# Patient Record
Sex: FEMALE | Race: WHITE | NOT HISPANIC OR LATINO | Employment: PART TIME | URBAN - METROPOLITAN AREA
[De-identification: names, ages, dates, MRNs, and addresses within clinical notes are randomized per-mention and may not be internally consistent; named-entity substitution may affect disease eponyms.]

---

## 2017-08-16 ENCOUNTER — ALLSCRIPTS OFFICE VISIT (OUTPATIENT)
Dept: OTHER | Facility: OTHER | Age: 47
End: 2017-08-16

## 2017-08-28 ENCOUNTER — GENERIC CONVERSION - ENCOUNTER (OUTPATIENT)
Dept: OTHER | Facility: OTHER | Age: 47
End: 2017-08-28

## 2017-10-31 ENCOUNTER — ALLSCRIPTS OFFICE VISIT (OUTPATIENT)
Dept: OTHER | Facility: OTHER | Age: 47
End: 2017-10-31

## 2017-11-01 NOTE — PROGRESS NOTES
Assessment  1  Acute sinusitis (461 9) (J01 90)   2  Allergic rhinitis (477 9) (J30 9)   3  Mild persistent asthma (493 90) (J45 30)    Plan  Acute sinusitis    · Amoxicillin 875 MG Oral Tablet; TAKE 1 TABLET EVERY 12 HOURS DAILY with  food  Acute sinusitis, Allergic rhinitis    · MethylPREDNISolone 4 MG Oral Tablet Therapy Pack; take as directed  Mild persistent asthma    · Symbicort 160-4 5 MCG/ACT Inhalation Aerosol; INHALE 2 PUFFS TWICE DAILY  RINSE MOUTH AFTER USE   · From  Ventolin  (90 Base) MCG/ACT Inhalation Aerosol Solution USE  2 PUFFS EVERY 4-6 HOURS AS NEEDED To Ventolin  (90 Base) MCG/ACT  Inhalation Aerosol Solution use 2 puffs every 4-6 hours as needed    Discussion/Summary    1  Acute sinusitis - will start cAmoxil 875 mg one tablet twice daily for 10 days, Medrol Dosepak  rhinitis ârecommended to use saline sinus rinse  persistent asthma - start Symbicort 160/4 5 mcg 2 puffs twice daily  Use Ventolin HFA inhaler PRN  was advised to call with update on her symptoms in 7-10 days  The patient was counseled regarding instructions for management,-- risk factor reductions,-- risks and benefits of treatment options,-- importance of compliance with treatment  total time of encounter was 25 minutes-- and-- 15 minutes was spent counseling  Possible side effects of new medications were reviewed with the patient/guardian today  The treatment plan was reviewed with the patient/guardian  The patient/guardian understands and agrees with the treatment plan      Chief Complaint  Patient presents with a sinus headache and ringing in her ears  History of Present Illness  HPI: Patient presents to the office c/o sinus congestion/ pressure, sinus headache, throbbing sensation in her face for the past week  has history of allergic rhinitis and asthma  Denies fever, chills  No sore throat    has been using Ventolin HFA inhaler a few times per day recently  states that her symptoms worsening with exposure to dust and pollens  ringing in ears for the last few months  was evaluated by ENT Dr Caden Mayen in August 2017, had hearing test done which showed mild sensorineural hearing hearing loss  - patient tried Claritin, Allegra, Zyrtec, Singular without any relief in symptoms  did not try steroid nasal spray because she does not like fluid going into her nose '       Review of Systems    Constitutional: feeling tired, but-- no fever-- and-- no chills  ENT: as noted in HPI  Cardiovascular: no chest pain-- and-- no palpitations  Respiratory: intermittent chest tightness, but-- no shortness of breath-- and-- no wheezing--    The patient presents with complaints of occasional episodes of mild cough, described as dry  Gastrointestinal: no abdominal pain,-- no nausea,-- no vomiting-- and-- no diarrhea  Integumentary: no rashes,-- no itching-- and-- no skin wound  Neurological: headache--    The patient presents with complaints of occasional episodes of mild dizziness  Active Problems  1  Acute sinusitis (461 9) (J01 90)   2  Other seasonal allergic rhinitis (477 8) (J30 2)   3  Tinnitus of both ears (388 30) (H93 13)    Past Medical History  1  History of Moderate persistent asthma with acute exacerbation (493 92) (J45 41)  Active Problems And Past Medical History Reviewed: The active problems and past medical history were reviewed and updated today  Family History  Mother    1  Family history of diabetes mellitus (V18 0) (Z83 3)   2  Family history of hypertension (V17 49) (Z82 49)   3  Family history of thyroid disease (V18 19) (Z83 49)  Father    4  Family history of malignant neoplasm of prostate (V16 42) (Z80 45)  Family History Reviewed: The family history was reviewed and updated today  Social History   · No drug use   · Quit smoking (V15 82) (Z87 891)   · Social drinker  The social history was reviewed and updated today  Surgical History  1  History of Foot Surgery   2  History of Tonsillectomy  Surgical History Reviewed: The surgical history was reviewed and updated today  Current Meds   1  Montelukast Sodium 10 MG Oral Tablet; TAKE 1 TABLET AT BEDTIME; Therapy: [id number] to (Last Rx:19Jan2016) Ordered   2  Ventolin  (90 Base) MCG/ACT Inhalation Aerosol Solution; USE 2 PUFFS   EVERY 4-6 HOURS AS NEEDED; Therapy: [id number] to (Complete:66Ebd9305)  Requested for: 44WOT2045; Last   Rx:[id number] Ordered    The medication list was reviewed and updated today  Allergies  1  No Known Drug Allergies    Vitals   Recorded: 56OXA0640 02:24PM   Temperature 98 7 F, Tympanic   Heart Rate 94, L Radial   Pulse Quality Normal, L Radial   Respiration Quality Normal   Respiration 16   Systolic 568, LUE, Sitting   Diastolic 78, LUE, Sitting   Height 5 ft 4 5 in   Weight 171 lb 6 oz   BMI Calculated 28 96   BSA Calculated 1 84   Pain Scale 5     Physical Exam    Constitutional   General appearance: No acute distress, well appearing and well nourished  Eyes   Conjunctiva and lids: No swelling, erythema or discharge  Pupils and irises: Equal, round and reactive to light  Ears, Nose, Mouth, and Throat   External inspection of ears and nose: Normal     Otoscopic examination: Tympanic membranes translucent with normal light reflex  Canals patent without erythema  Nasal mucosa, septum, and turbinates: Abnormal   The bilateral nasal mucosa was edematous-- and-- red  Oropharynx: Normal with no erythema, edema, exudate or lesions  Pulmonary   Respiratory effort: No increased work of breathing or signs of respiratory distress  Auscultation of lungs: Clear to auscultation  Cardiovascular   Auscultation of heart: Normal rate and rhythm, normal S1 and S2, without murmurs  Examination of extremities for edema and/or varicosities: Normal     Abdomen   Abdomen: Non-tender, no masses  Lymphatic   Palpation of lymph nodes in neck: No lymphadenopathy      Skin   Skin and subcutaneous tissue: Normal without rashes or lesions      Psychiatric   Mood and affect: Normal          Signatures   Electronically signed by : SHANTELLE Melendez ; Oct 31 2017  3:04PM EST                       (Author)

## 2018-01-11 NOTE — PROGRESS NOTES
Assessment    1  Moderate persistent asthma with acute exacerbation (493 92) (J45 41)   2  Other seasonal allergic rhinitis (477 8) (J30 2)    Plan  Moderate persistent asthma with acute exacerbation    · Albuterol Sulfate (2 5 MG/3ML) 0 083% Inhalation Nebulization Solution; INHALE 1  MINI NEB VIA NEBULIZER ORALLY EVERY 6 HOURS AS NEEDED FOR SHORTNESS  OF BREATH *RE-ORDER*   · Dulera 200-5 MCG/ACT Inhalation Aerosol; USE 2 INHALATIONS EVERY 12  HOURS   · Respiratory Equipment Nebulizer; Status:Complete;   Done: [id number]  Moderate persistent asthma with acute exacerbation, Other seasonal allergic rhinitis    · Montelukast Sodium 10 MG Oral Tablet (Singulair); TAKE 1 TABLET AT BEDTIME   · Follow-up visit in 2 weeks Evaluation and Treatment  Follow-up  Status: Hold For -  Scheduling  Requested for: [id number]  Other seasonal allergic rhinitis    · Nasonex 50 MCG/ACT Nasal Suspension; use 2 spr  in each nostril daily    Discussion/Summary    1  Moderate persistent asthma with acute exacerbation - discussed treatment options  Recommended to start Albuterol 2 5 mg via nebulizer at home every 6 hours  Prescription given for nebulizer machine for home use  Start Dulera 200/5 mcg 2 puffs twice daily  Sample provided to patient  2 Allergic rhinitis -start Nasonex 2 sprays in each nostril  Sample given to patient  Start Singular 10 mg at bedtime  Call if symptoms persist or worsen  Schedule followup office visit in 2 weeks  Possible side effects of new medications were reviewed with the patient/guardian today  The treatment plan was reviewed with the patient/guardian  The patient/guardian understands and agrees with the treatment plan   The patient was counseled regarding instructions for management, risk factor reductions, risks and benefits of treatment options, importance of compliance with treatment        Chief Complaint  Patient is here to follow up her visit to [facility] for asthma where she was diagnosed with acute bronchitis  Patient states she is not better, she still sneezing constantly and have stuffy nose  History of Present Illness  Patient is 76-year-old female with h/o Asthma, Allergic rhinitis  She presents to the office for ER followup visit  Patient was seen on December 25, 2015 in [facility] ER for acute bronchitis, asthma exacerbation  She was given prescription for Prednisone, Zithromax, Albuterol  Patient states that she does not feel better  C/o dry cough, sneezing, chest tightness, wheezing  No fever or chills  Patient states that [facility] not helping  Patient quit smoking in 11/15  Patient does not have medical insurance  She states that she can not afford to pay for mammogram, gynecologic exam, Pap smear  Review of Systems    Constitutional: no fever, no chills and not feeling tired  Eyes: no eye pain, no eyesight problems, no dryness of the eyes, eyes not red, no purulent discharge from the eyes and no itching of the eyes  ENT: sneezing, PND, but no earache, no nosebleeds, no sore throat, no hearing loss, no nasal discharge and no hoarseness  Cardiovascular: no chest pain, no intermittent leg claudication, no palpitations and no lower extremity edema  Respiratory: wheezing and chest tightness, but no shortness of breath    The patient presents with complaints of cough, described as non-productive  Gastrointestinal: no abdominal pain, no nausea, no vomiting, no constipation, no diarrhea and no blood in stools  Musculoskeletal: no arthralgias, no joint swelling and no myalgias  Integumentary: no rashes, no itching and no skin wound  Neurological: no headache and no dizziness  Hematologic/Lymphatic: No complaints of swollen glands, no swollen glands in the neck, does not bleed easily, does not bruise easily  Active Problems    1  Asthma (547 90) (J45 909)   2   Other seasonal allergic rhinitis (477 8) (J30 2)    Surgical History    1  History of Foot Surgery   2  History of Tonsillectomy    Family History    1  Family history of diabetes mellitus (V18 0) (Z83 3)   2  Family history of hypertension (V17 49) (Z82 49)   3  Family history of thyroid disease (V18 19) (Z83 49)    4  Family history of malignant neoplasm of prostate (E61 38) (Z80 42)    Social History    · No drug use   · Quit smoking (V15 82) (Z87 898)   · Social drinker  The social history was reviewed and updated today  Current Meds   1  Ventolin  (90 Base) MCG/ACT Inhalation Aerosol Solution; USE 2 PUFFS EVERY   4-6 HOURS AS NEEDED; Therapy: 20ZDZ3330 to (Complete:26Mar2016); Last Rx:27Nov2015 Ordered    The medication list was reviewed and updated today  Allergies    1  No Known Drug Allergies    Vitals  Vital Signs [Data Includes: Current Encounter]    Recorded: 93XOA5569 08:52AM   Temperature 99 F   Heart Rate 85   Respiration 16   Systolic 445   Diastolic 80   Height 5 ft 4 5 in   Weight 168 lb 4 00 oz   BMI Calculated 28 43   BSA Calculated 1 83   O2 Saturation 99   Pain Scale 0     Physical Exam    Constitutional   General appearance: No acute distress, well appearing and well nourished  Eyes   Conjunctiva and lids: No swelling, erythema or discharge  Pupils and irises: Equal, round and reactive to light  Ears, Nose, Mouth, and Throat   External inspection of ears and nose: Normal     Otoscopic examination: Tympanic membranes translucent with normal light reflex  Canals patent without erythema  Nasal mucosa, septum, and turbinates: Abnormal   The bilateral nasal mucosa was edematous and red  Oropharynx: Normal with no erythema, edema, exudate or lesions  Pulmonary   Respiratory effort: No increased work of breathing or signs of respiratory distress  Auscultation of lungs: Abnormal   Auscultation of the lungs revealed decreased air entry, scattered wheezing     Cardiovascular   Auscultation of heart: Normal rate and rhythm, normal S1 and S2, without murmurs  Examination of extremities for edema and/or varicosities: Normal     Abdomen   Abdomen: Non-tender, no masses  Liver and spleen: No hepatomegaly or splenomegaly  Musculoskeletal   Gait and station: Normal     Digits and nails: Normal without clubbing or cyanosis  Inspection/palpation of joints, bones, and muscles: Normal     Skin   Skin and subcutaneous tissue: Normal without rashes or lesions           Signatures   Electronically signed by : SHANTELLE Chambers ; Jan 19 2016 10:14AM EST                       (Author)

## 2018-01-12 VITALS
TEMPERATURE: 98.2 F | SYSTOLIC BLOOD PRESSURE: 120 MMHG | RESPIRATION RATE: 12 BRPM | WEIGHT: 170.13 LBS | HEIGHT: 65 IN | HEART RATE: 62 BPM | BODY MASS INDEX: 28.35 KG/M2 | DIASTOLIC BLOOD PRESSURE: 74 MMHG

## 2018-01-13 VITALS
RESPIRATION RATE: 16 BRPM | HEART RATE: 94 BPM | HEIGHT: 65 IN | SYSTOLIC BLOOD PRESSURE: 120 MMHG | TEMPERATURE: 98.7 F | BODY MASS INDEX: 28.55 KG/M2 | WEIGHT: 171.38 LBS | DIASTOLIC BLOOD PRESSURE: 78 MMHG

## 2018-01-24 ENCOUNTER — OFFICE VISIT (OUTPATIENT)
Dept: FAMILY MEDICINE CLINIC | Facility: CLINIC | Age: 48
End: 2018-01-24
Payer: COMMERCIAL

## 2018-01-24 ENCOUNTER — TRANSCRIBE ORDERS (OUTPATIENT)
Dept: ADMINISTRATIVE | Age: 48
End: 2018-01-24

## 2018-01-24 ENCOUNTER — APPOINTMENT (OUTPATIENT)
Dept: LAB | Facility: HOSPITAL | Age: 48
End: 2018-01-24
Payer: COMMERCIAL

## 2018-01-24 ENCOUNTER — DOCUMENTATION (OUTPATIENT)
Dept: FAMILY MEDICINE CLINIC | Facility: CLINIC | Age: 48
End: 2018-01-24

## 2018-01-24 ENCOUNTER — APPOINTMENT (OUTPATIENT)
Dept: RADIOLOGY | Age: 48
End: 2018-01-24
Payer: COMMERCIAL

## 2018-01-24 VITALS
WEIGHT: 172.8 LBS | HEART RATE: 114 BPM | HEIGHT: 66 IN | RESPIRATION RATE: 20 BRPM | BODY MASS INDEX: 27.77 KG/M2 | OXYGEN SATURATION: 92 % | TEMPERATURE: 101 F | SYSTOLIC BLOOD PRESSURE: 108 MMHG | DIASTOLIC BLOOD PRESSURE: 62 MMHG

## 2018-01-24 DIAGNOSIS — J45.21 MILD INTERMITTENT ASTHMA WITH ACUTE EXACERBATION: ICD-10-CM

## 2018-01-24 DIAGNOSIS — J11.1 INFLUENZA: ICD-10-CM

## 2018-01-24 DIAGNOSIS — J11.1 INFLUENZA: Primary | ICD-10-CM

## 2018-01-24 PROBLEM — J45.901 ACUTE ASTHMA EXACERBATION: Status: ACTIVE | Noted: 2018-01-24

## 2018-01-24 PROCEDURE — 71046 X-RAY EXAM CHEST 2 VIEWS: CPT

## 2018-01-24 PROCEDURE — 87798 DETECT AGENT NOS DNA AMP: CPT

## 2018-01-24 PROCEDURE — 99214 OFFICE O/P EST MOD 30 MIN: CPT | Performed by: FAMILY MEDICINE

## 2018-01-24 RX ORDER — BUDESONIDE AND FORMOTEROL FUMARATE DIHYDRATE 160; 4.5 UG/1; UG/1
2 AEROSOL RESPIRATORY (INHALATION) 2 TIMES DAILY
COMMUNITY
Start: 2017-10-31 | End: 2018-06-18 | Stop reason: SDUPTHER

## 2018-01-24 RX ORDER — ALBUTEROL SULFATE 90 UG/1
2 AEROSOL, METERED RESPIRATORY (INHALATION) DAILY
COMMUNITY
Start: 2015-11-27 | End: 2018-02-28

## 2018-01-24 RX ORDER — DEXTROMETHORPHAN HYDROBROMIDE AND PROMETHAZINE HYDROCHLORIDE 15; 6.25 MG/5ML; MG/5ML
5 SYRUP ORAL 4 TIMES DAILY PRN
Qty: 240 ML | Refills: 0 | Status: SHIPPED | OUTPATIENT
Start: 2018-01-24 | End: 2018-08-09 | Stop reason: ALTCHOICE

## 2018-01-24 RX ORDER — PREDNISONE 10 MG/1
20 TABLET ORAL DAILY
Qty: 20 TABLET | Refills: 0 | Status: SHIPPED | OUTPATIENT
Start: 2018-01-24 | End: 2018-01-29

## 2018-01-24 RX ORDER — OSELTAMIVIR PHOSPHATE 75 MG/1
75 CAPSULE ORAL EVERY 12 HOURS SCHEDULED
Qty: 10 CAPSULE | Refills: 0 | Status: SHIPPED | OUTPATIENT
Start: 2018-01-24 | End: 2018-01-29

## 2018-01-24 NOTE — LETTER
January 24, 2018     Patient: Dasha Gomez   YOB: 1970   Date of Visit: 1/24/2018       To Whom it May Concern:    Usama Jones is under my professional care  She was seen in my office on 1/24/2018  She may return to work on 01/29/2018  If you have any questions or concerns, please don't hesitate to call           Sincerely,          Maggie Clement MD        CC: No Recipients

## 2018-01-24 NOTE — PROGRESS NOTES
Chief Complaint   Patient presents with    Generalized Body Aches     Mostly all over body    Cough     productiev- beige mucous    Fever     sweats & chills    Nasal Congestion    Sore Throat     not as bad       Assessment/Plan:    Acute asthma exacerbation  Asthma exacerbation:  Will start prednisone 20 milligrams 2 tablets daily for 5 days  Use Ventolin HFA inhaler p r n  for chest tightness or weezing  Subjective:      Patient ID: Evelia Romero is a 49 y o  female  HPI    The following portions of the patient's history were reviewed and updated as appropriate: allergies, current medications, past family history, past medical history, past social history and past surgical history      Review of Systems      Objective:     Physical Exam

## 2018-01-24 NOTE — PROGRESS NOTES
Assessment/Plan:    No problem-specific Assessment & Plan notes found for this encounter  {Assess/PlanSmartLinks:80785}      Subjective:      Patient ID: Howard Loaiza is a 49 y o  female  Generalized Body Aches   Associated symptoms include a sore throat and coughing  Cough   Associated symptoms include a sore throat  Sore Throat    Associated symptoms include coughing  {Common ambulatory SmartLinks:70581}    Review of Systems   HENT: Positive for sore throat  Respiratory: Positive for cough            Objective:     Physical Exam

## 2018-01-24 NOTE — ASSESSMENT & PLAN NOTE
Asthma exacerbation:  Will start prednisone 20 milligrams 2 tablets daily for 5 days  Use Ventolin HFA inhaler p r n  for chest tightness or weezing

## 2018-01-24 NOTE — PROGRESS NOTES
Assessment/Plan:         Diagnoses and all orders for this visit:    Influenza  -     XR chest pa & lateral; Future  -     oseltamivir (TAMIFLU) 75 mg capsule; Take 1 capsule by mouth every 12 (twelve) hours for 5 days  -     promethazine-dextromethorphan (PHENERGAN-DM) 6 25-15 mg/5 mL oral syrup; Take 5 mL by mouth 4 (four) times a day as needed for cough  -     Influenza A/B and RSV by PCR; Future    Mild intermittent asthma with acute exacerbation  -     predniSONE 10 mg tablet; Take 2 tablets by mouth daily for 5 days    Other orders  -     budesonide-formoterol (SYMBICORT) 160-4 5 mcg/act inhaler; Inhale 2 puffs 2 (two) times a day  -     albuterol (VENTOLIN HFA) 90 mcg/act inhaler; Inhale 2 puffs daily          Subjective:      Patient ID: Tyrese Carlson is a 49 y o  female  Patient presents c/o fever, body aches, feeling tired, sore throat, dry cough for the last 2-3 days  Symptoms are worsening  Took Ibuprofen for fever  Patient has asthma  She has Ventolin HFA inhaler at home  Denies wheezing  Complains of mild chest tightness  Generalized Body Aches   Associated symptoms include congestion, headaches, a sore throat, fatigue, a fever, coughing and muscle aches  Pertinent negatives include no eye discharge, eye itching, eye redness, mouth sores, chest pain, shortness of breath, wheezing, abdominal pain, diarrhea, nausea or rash  Cough   Associated symptoms include a fever, headaches, myalgias and a sore throat  Pertinent negatives include no chest pain, eye redness, rash, shortness of breath or wheezing  Sore Throat    Associated symptoms include congestion, coughing and headaches  Pertinent negatives include no abdominal pain, diarrhea or shortness of breath         The following portions of the patient's history were reviewed and updated as appropriate: current medications, past family history, past surgical history and problem list     Review of Systems   Constitutional: Positive for fatigue and fever  HENT: Positive for congestion and sore throat  Negative for mouth sores  Eyes: Negative for discharge, redness and itching  Respiratory: Positive for cough and chest tightness  Negative for shortness of breath and wheezing  Cardiovascular: Negative for chest pain and palpitations  Gastrointestinal: Negative for abdominal pain, diarrhea and nausea  Musculoskeletal: Positive for arthralgias and myalgias  Skin: Negative for rash  Neurological: Positive for headaches  Objective:     Physical Exam   Constitutional: She appears well-developed and well-nourished  Acutely ill   HENT:   Head: Normocephalic and atraumatic  Pharynx: erythematous, no exudate   Eyes: Conjunctivae and EOM are normal  Pupils are equal, round, and reactive to light  Neck: Normal range of motion  Neck supple  Cardiovascular: Normal rate, regular rhythm and normal heart sounds  Pulmonary/Chest: No respiratory distress  She has no wheezes  Decreased breath sounds Bl, decreased air entry   Abdominal: Soft  Bowel sounds are normal    Musculoskeletal: Normal range of motion  Skin: Skin is warm and dry  No rash noted

## 2018-01-25 DIAGNOSIS — E78.2 HYPERLIPEMIA, MIXED: ICD-10-CM

## 2018-01-25 DIAGNOSIS — J06.9 UPPER RESPIRATORY INFECTION, ACUTE: Primary | ICD-10-CM

## 2018-01-25 LAB
FLUAV AG SPEC QL: NORMAL
FLUBV AG SPEC QL: NORMAL
RSV B RNA SPEC QL NAA+PROBE: NORMAL

## 2018-01-25 RX ORDER — AZITHROMYCIN 250 MG/1
TABLET, FILM COATED ORAL
Qty: 6 TABLET | Refills: 0 | Status: SHIPPED | OUTPATIENT
Start: 2018-01-29 | End: 2018-01-29

## 2018-02-01 ENCOUNTER — TELEPHONE (OUTPATIENT)
Dept: FAMILY MEDICINE CLINIC | Facility: CLINIC | Age: 48
End: 2018-02-01

## 2018-02-01 NOTE — TELEPHONE ENCOUNTER
Call patient  She was seen over 1 week ago  Recommend to come for reevaluation, check  Lungs,  will decide what tretment is necessary  You can schedule appointment with me today

## 2018-02-02 ENCOUNTER — OFFICE VISIT (OUTPATIENT)
Dept: FAMILY MEDICINE CLINIC | Facility: CLINIC | Age: 48
End: 2018-02-02
Payer: COMMERCIAL

## 2018-02-02 VITALS
HEART RATE: 90 BPM | SYSTOLIC BLOOD PRESSURE: 118 MMHG | BODY MASS INDEX: 28.57 KG/M2 | RESPIRATION RATE: 16 BRPM | TEMPERATURE: 98.6 F | OXYGEN SATURATION: 96 % | DIASTOLIC BLOOD PRESSURE: 72 MMHG | WEIGHT: 177.8 LBS | HEIGHT: 66 IN

## 2018-02-02 DIAGNOSIS — J20.9 ACUTE BRONCHITIS, UNSPECIFIED ORGANISM: ICD-10-CM

## 2018-02-02 DIAGNOSIS — J45.21 MILD INTERMITTENT ASTHMA WITH ACUTE EXACERBATION: Primary | ICD-10-CM

## 2018-02-02 PROCEDURE — 94640 AIRWAY INHALATION TREATMENT: CPT | Performed by: FAMILY MEDICINE

## 2018-02-02 PROCEDURE — 99214 OFFICE O/P EST MOD 30 MIN: CPT | Performed by: FAMILY MEDICINE

## 2018-02-02 RX ORDER — DEXTROMETHORPHAN HYDROBROMIDE AND PROMETHAZINE HYDROCHLORIDE 15; 6.25 MG/5ML; MG/5ML
SYRUP ORAL
Qty: 180 ML | Refills: 0 | Status: SHIPPED | OUTPATIENT
Start: 2018-02-02 | End: 2018-08-09 | Stop reason: ALTCHOICE

## 2018-02-02 RX ORDER — LEVOFLOXACIN 500 MG/1
500 TABLET, FILM COATED ORAL EVERY 24 HOURS
Qty: 7 TABLET | Refills: 0 | Status: SHIPPED | OUTPATIENT
Start: 2018-02-02 | End: 2018-02-09

## 2018-02-02 RX ORDER — ALBUTEROL SULFATE 2.5 MG/3ML
2.5 SOLUTION RESPIRATORY (INHALATION) ONCE
Status: COMPLETED | OUTPATIENT
Start: 2018-02-02 | End: 2018-02-02

## 2018-02-02 RX ORDER — ALBUTEROL SULFATE 0.63 MG/3ML
1 SOLUTION RESPIRATORY (INHALATION) EVERY 6 HOURS
COMMUNITY
End: 2018-02-02

## 2018-02-02 RX ADMIN — ALBUTEROL SULFATE 2.5 MG: 2.5 SOLUTION RESPIRATORY (INHALATION) at 11:47

## 2018-02-02 NOTE — PROGRESS NOTES
Assessment/Plan:    Acute asthma exacerbation  Albuterol 2 5 mg administered via nebulizer with some symptomatic improvement  Patient declined to get a nebulizer machine for home use  She will use Ventolin HFA inhaler PRN for chest tightness, wheezing  Acute bronchitis  Will start Levaquin 500 mg one tablet daily with food for 7 days, Promethazine DM cough syrup PRN for cough  Recommended to stay well hydrated  Call office if symptoms persist or worsen  Diagnoses and all orders for this visit:    Mild intermittent asthma with acute exacerbation  -     albuterol inhalation solution 2 5 mg; Take 3 mL (2 5 mg total) by nebulization once     Acute bronchitis, unspecified organism  -     levofloxacin (LEVAQUIN) 500 mg tablet; Take 1 tablet (500 mg total) by mouth every 24 hours for 7 days  -     promethazine-dextromethorphan (PHENERGAN-DM) 6 25-15 mg/5 mL oral syrup; Use 1 tsp  every 6 hr  PRN for cough    Other orders  -     Discontinue: albuterol (ACCUNEB) 0 63 MG/3ML nebulizer solution; Inhale 1 ampule every 6 (six) hours          Subjective:      Patient ID: Lee Romero is a 49 y o  female  Patient presents to the office complaining of persistent cough, chest congestion, brings up yellow mucus  C/o occasional shortness of breath, chest tightness, wheezing  Denies fever, chills  Patient has asthma  She uses Symbicort 160/4 5 mcg -2 puffs twice daily, Ventolin HFA inhaler 2-3 tmes daily  Patient was seen in the office on 1/24/18  for upper respiratory infection  Chest x-ray was negative for pneumonia  Influenza A and B by PCR was negative  Patient completed  Zithromax for 5 days, oral Prednisone course  Cough   Associated symptoms include shortness of breath and wheezing  Pertinent negatives include no chest pain, ear pain, fever, headaches, myalgias, postnasal drip or rash         The following portions of the patient's history were reviewed and updated as appropriate: allergies, current medications, past family history, past medical history, past social history, past surgical history and problem list     Review of Systems   Constitutional: Negative for activity change, appetite change, fatigue and fever  HENT: Positive for congestion  Negative for ear pain, nosebleeds, postnasal drip and sinus pressure  Respiratory: Positive for cough, chest tightness, shortness of breath and wheezing  Cardiovascular: Negative for chest pain, palpitations and leg swelling  Gastrointestinal: Negative for abdominal pain, constipation, diarrhea, nausea and vomiting  Genitourinary: Negative for dysuria, frequency and hematuria  Musculoskeletal: Negative for arthralgias, joint swelling and myalgias  Skin: Negative for color change, rash and wound  Neurological: Negative for dizziness and headaches  Hematological: Negative  Psychiatric/Behavioral: Negative  Objective:     Physical Exam   Constitutional: She appears well-developed and well-nourished  No distress  HENT:   Head: Normocephalic and atraumatic  Right Ear: External ear normal    Left Ear: External ear normal    Nose: Nose normal    Mouth/Throat: Oropharynx is clear and moist    Eyes: Conjunctivae are normal  Pupils are equal, round, and reactive to light  Neck: Normal range of motion  Cardiovascular: Normal rate, regular rhythm and normal heart sounds  No murmur heard  Pulmonary/Chest: Effort normal  No respiratory distress  She has wheezes  She has rales  Decreased air entry   Abdominal: Soft  Bowel sounds are normal  There is no tenderness  Lymphadenopathy:     She has no cervical adenopathy  Skin: Skin is warm and dry  No rash noted  Psychiatric: She has a normal mood and affect   Her behavior is normal

## 2018-02-02 NOTE — ASSESSMENT & PLAN NOTE
Albuterol 2 5 mg administered via nebulizer with some symptomatic improvement  Patient declined to get a nebulizer machine for home use  She will use Ventolin HFA inhaler PRN for chest tightness, wheezing

## 2018-02-02 NOTE — ASSESSMENT & PLAN NOTE
Will start Levaquin 500 mg one tablet daily with food for 7 days, Promethazine DM cough syrup PRN for cough  Recommended to stay well hydrated  Call office if symptoms persist or worsen

## 2018-06-18 DIAGNOSIS — J45.20 MILD INTERMITTENT ASTHMA WITHOUT COMPLICATION: Primary | ICD-10-CM

## 2018-06-18 RX ORDER — BUDESONIDE AND FORMOTEROL FUMARATE DIHYDRATE 160; 4.5 UG/1; UG/1
AEROSOL RESPIRATORY (INHALATION)
Qty: 10.2 INHALER | Refills: 5 | Status: SHIPPED | OUTPATIENT
Start: 2018-06-18 | End: 2019-08-07 | Stop reason: SDUPTHER

## 2018-08-09 ENCOUNTER — OFFICE VISIT (OUTPATIENT)
Dept: FAMILY MEDICINE CLINIC | Facility: CLINIC | Age: 48
End: 2018-08-09
Payer: COMMERCIAL

## 2018-08-09 VITALS
HEIGHT: 66 IN | WEIGHT: 170.6 LBS | OXYGEN SATURATION: 98 % | BODY MASS INDEX: 27.42 KG/M2 | TEMPERATURE: 97.7 F | DIASTOLIC BLOOD PRESSURE: 82 MMHG | HEART RATE: 68 BPM | SYSTOLIC BLOOD PRESSURE: 130 MMHG | RESPIRATION RATE: 16 BRPM

## 2018-08-09 DIAGNOSIS — J45.30 MILD PERSISTENT ASTHMA WITHOUT COMPLICATION: ICD-10-CM

## 2018-08-09 DIAGNOSIS — J01.00 ACUTE NON-RECURRENT MAXILLARY SINUSITIS: Primary | ICD-10-CM

## 2018-08-09 PROBLEM — J11.1 INFLUENZA: Status: RESOLVED | Noted: 2018-01-24 | Resolved: 2018-08-09

## 2018-08-09 PROBLEM — J45.901 ACUTE ASTHMA EXACERBATION: Status: RESOLVED | Noted: 2018-01-24 | Resolved: 2018-08-09

## 2018-08-09 PROBLEM — J30.9 ALLERGIC RHINITIS: Status: ACTIVE | Noted: 2017-10-31

## 2018-08-09 PROBLEM — J20.9 ACUTE BRONCHITIS: Status: RESOLVED | Noted: 2018-02-02 | Resolved: 2018-08-09

## 2018-08-09 PROCEDURE — 99213 OFFICE O/P EST LOW 20 MIN: CPT | Performed by: NURSE PRACTITIONER

## 2018-08-09 PROCEDURE — 3008F BODY MASS INDEX DOCD: CPT | Performed by: NURSE PRACTITIONER

## 2018-08-09 PROCEDURE — 1036F TOBACCO NON-USER: CPT | Performed by: NURSE PRACTITIONER

## 2018-08-09 RX ORDER — AMOXICILLIN AND CLAVULANATE POTASSIUM 875; 125 MG/1; MG/1
1 TABLET, FILM COATED ORAL EVERY 12 HOURS SCHEDULED
Qty: 20 TABLET | Refills: 0 | Status: SHIPPED | OUTPATIENT
Start: 2018-08-09 | End: 2018-08-19

## 2018-08-09 NOTE — PROGRESS NOTES
Chief Complaint   Patient presents with    Sinusitis     for the past few days       Assessment/Plan:    1  Acute sinusitis- to start Augmentin  Flonase prn (has at home already)  Increase PO fluids and rest   OTC Mucinex prn  OTC Tylenol prn, max 3g/24 hours  Call office if symptoms worsen or persist     2  Mild intermittent asthma- stable  No recent exacerbations  Advised to use Symbicort daily rather than prn  Has Ventolin for prn use      Diagnoses and all orders for this visit:    Acute non-recurrent maxillary sinusitis  -     amoxicillin-clavulanate (AUGMENTIN) 875-125 mg per tablet; Take 1 tablet by mouth every 12 (twelve) hours for 10 days    Mild persistent asthma without complication          Subjective:      Patient ID: Karly Cole is a 51 y o  female  HPI   Pt presents by herself today for an acute visit   C/o sinus pressure for the past 3 days   Ears feel full, right worse than left   No ear drainage or muffled sounds   +headache  +fatigued   +rhinorrhea with some PND   No sore throat or coughing   No fevers, chills, N/V/D    No known sick contacts  No recent travel     Was taking Tylenol OTC with no improvement     Asthma- no recent asthma exacerbations   Denies SOB or wheezing   Does not use Symbicort regularly- only with flare ups   Has Ventolin for prn use, hasn't used in several months    The following portions of the patient's history were reviewed and updated as appropriate: allergies, current medications, past medical history, past social history and problem list     Review of Systems   Constitutional: Positive for fatigue  Negative for activity change, appetite change, chills, diaphoresis, fever and unexpected weight change  HENT: Positive for congestion, postnasal drip, rhinorrhea and sinus pressure  Negative for ear discharge, ear pain (ear pressures B/L), sore throat, tinnitus, trouble swallowing and voice change      Eyes: Negative for discharge, itching and visual disturbance  Respiratory: Negative for cough, chest tightness, shortness of breath and wheezing  Cardiovascular: Negative for chest pain, palpitations and leg swelling  Gastrointestinal: Negative for abdominal pain, constipation, diarrhea and nausea  Genitourinary: Negative for dysuria  Musculoskeletal: Negative for arthralgias and myalgias  Skin: Negative for rash and wound  Neurological: Positive for headaches  Negative for dizziness  Hematological: Negative for adenopathy  Does not bruise/bleed easily  Objective:      /82   Pulse 68   Temp 97 7 °F (36 5 °C) (Tympanic)   Resp 16   Ht 5' 5 5" (1 664 m)   Wt 77 4 kg (170 lb 9 6 oz)   LMP 08/02/2018   SpO2 98%   BMI 27 96 kg/m²          Physical Exam   Constitutional: She is oriented to person, place, and time  She appears well-developed and well-nourished  No distress  HENT:   Head: Normocephalic and atraumatic  Right Ear: Hearing, tympanic membrane, external ear and ear canal normal    Left Ear: Hearing, tympanic membrane, external ear and ear canal normal    Nose: Mucosal edema and rhinorrhea present  Right sinus exhibits maxillary sinus tenderness  Right sinus exhibits no frontal sinus tenderness  Left sinus exhibits maxillary sinus tenderness  Left sinus exhibits no frontal sinus tenderness  Mouth/Throat: Mucous membranes are normal  Posterior oropharyngeal erythema present  No oropharyngeal exudate  +PND   Eyes: Conjunctivae are normal  Pupils are equal, round, and reactive to light  Neck: Normal range of motion  Neck supple  Cardiovascular: Normal rate, regular rhythm and normal heart sounds  No murmur heard  No LE edema B/L   Pulmonary/Chest: Effort normal and breath sounds normal  No respiratory distress  She has no wheezes  Abdominal: Soft  Bowel sounds are normal  She exhibits no distension  There is no tenderness  Musculoskeletal: Normal range of motion     Lymphadenopathy:     She has no cervical adenopathy  Neurological: She is oriented to person, place, and time  Skin: Skin is warm and dry  She is not diaphoretic  Psychiatric: She has a normal mood and affect

## 2019-08-07 DIAGNOSIS — J45.20 MILD INTERMITTENT ASTHMA WITHOUT COMPLICATION: ICD-10-CM

## 2019-08-07 RX ORDER — BUDESONIDE AND FORMOTEROL FUMARATE DIHYDRATE 160; 4.5 UG/1; UG/1
2 AEROSOL RESPIRATORY (INHALATION) 2 TIMES DAILY
Qty: 10.2 INHALER | Refills: 0 | Status: SHIPPED | OUTPATIENT
Start: 2019-08-07 | End: 2020-01-27 | Stop reason: SDUPTHER

## 2019-08-07 NOTE — TELEPHONE ENCOUNTER
Patient wants a refill on Symbicort 160-4 5 mcg ACT inhaler called in to [facility] [address]  [facility] can be reached at [phone] any questions

## 2019-08-08 NOTE — TELEPHONE ENCOUNTER
I called patient and let her know she is due for a visit, but she declined to schedule at this time  I explained that it is important to try to come in yearly to update information and to continue getting her prescriptions filled  She will call back to set an appointment up

## 2019-08-13 NOTE — TELEPHONE ENCOUNTER
Postcard mailed to patient instructing her to make a follow-up appointment prior to next medication refill

## 2019-09-05 DIAGNOSIS — J45.20 MILD INTERMITTENT ASTHMA WITHOUT COMPLICATION: ICD-10-CM

## 2019-09-05 RX ORDER — BUDESONIDE AND FORMOTEROL FUMARATE DIHYDRATE 160; 4.5 UG/1; UG/1
AEROSOL RESPIRATORY (INHALATION)
Qty: 10.2 INHALER | Refills: 0 | OUTPATIENT
Start: 2019-09-05

## 2020-01-27 ENCOUNTER — OFFICE VISIT (OUTPATIENT)
Dept: FAMILY MEDICINE CLINIC | Facility: CLINIC | Age: 50
End: 2020-01-27
Payer: COMMERCIAL

## 2020-01-27 VITALS
HEART RATE: 72 BPM | WEIGHT: 174 LBS | HEIGHT: 66 IN | BODY MASS INDEX: 27.97 KG/M2 | SYSTOLIC BLOOD PRESSURE: 120 MMHG | TEMPERATURE: 99.1 F | RESPIRATION RATE: 16 BRPM | DIASTOLIC BLOOD PRESSURE: 72 MMHG

## 2020-01-27 DIAGNOSIS — E66.3 OVERWEIGHT (BMI 25.0-29.9): ICD-10-CM

## 2020-01-27 DIAGNOSIS — Z12.39 SCREENING FOR MALIGNANT NEOPLASM OF BREAST: ICD-10-CM

## 2020-01-27 DIAGNOSIS — R79.89 ABNORMAL TSH: ICD-10-CM

## 2020-01-27 DIAGNOSIS — J45.20 MILD INTERMITTENT ASTHMA WITHOUT COMPLICATION: Primary | ICD-10-CM

## 2020-01-27 DIAGNOSIS — Z00.00 HEALTHCARE MAINTENANCE: ICD-10-CM

## 2020-01-27 PROBLEM — J45.30 MILD PERSISTENT ASTHMA: Status: RESOLVED | Noted: 2017-10-31 | Resolved: 2020-01-27

## 2020-01-27 PROCEDURE — 3008F BODY MASS INDEX DOCD: CPT | Performed by: NURSE PRACTITIONER

## 2020-01-27 PROCEDURE — 99213 OFFICE O/P EST LOW 20 MIN: CPT | Performed by: NURSE PRACTITIONER

## 2020-01-27 PROCEDURE — 1036F TOBACCO NON-USER: CPT | Performed by: NURSE PRACTITIONER

## 2020-01-27 RX ORDER — ALBUTEROL SULFATE 90 UG/1
2 AEROSOL, METERED RESPIRATORY (INHALATION) EVERY 6 HOURS PRN
COMMUNITY
End: 2020-01-27 | Stop reason: SDUPTHER

## 2020-01-27 RX ORDER — BUDESONIDE AND FORMOTEROL FUMARATE DIHYDRATE 160; 4.5 UG/1; UG/1
2 AEROSOL RESPIRATORY (INHALATION) 2 TIMES DAILY
Qty: 10.2 INHALER | Refills: 0 | Status: SHIPPED | OUTPATIENT
Start: 2020-01-27 | End: 2020-02-21

## 2020-01-27 NOTE — PROGRESS NOTES
Chief Complaint   Patient presents with    Follow-up     med refill     Health Maintenance   Topic Date Due    MAMMOGRAM  1970    CRC Screening: Colonoscopy  1970    Pneumococcal Vaccine: Pediatrics (0 to 5 Years) and At-Risk Patients (6 to 59 Years) (1 of 1 - PPSV23) 06/26/1975    DTaP,Tdap,and Td Vaccines (1 - Tdap) 06/26/1980    HIV Screening  06/26/1984    BMI: Followup Plan  06/26/1987    Annual Physical  06/26/1987    Cervical Cancer Screening  06/26/1990    Influenza Vaccine  07/01/2019    Depression Screening PHQ  01/27/2021    BMI: Adult  01/27/2021    Pneumococcal Vaccine: 65+ Years (1 of 2 - PCV13) 06/26/2034    HIB Vaccine  Aged Out    Hepatitis B Vaccine  Aged Out    IPV Vaccine  Aged Out    Hepatitis A Vaccine  Aged Out    Meningococcal ACWY Vaccine  Aged Out    HPV Vaccine  Aged Out     Assessment/Plan:    Mammogram ordered  Referred to GYN for a routine exam/ PAP  Refused CRC screenings and we did review all of the available options for this  Refused Pneumovax, influenza vaccine and Tdap vaccines    I did explain the importance and necessity of coming to our office at least every 6 months for surveillance of chronic conditions     Mild intermittent asthma without complication  Inhalers refilled today  Advised to use Symbicort on a daily basis rather than prn    Overweight (BMI 25 0-29  9)  Encouraged regular exercise, 30 minutes 5 x per week, work on weight loss    Abnormal TSH  I did order lab work for the patient but she notes she will likely not have these performed  Advised to reconsider and explained the importance of monitoring lab work       Diagnoses and all orders for this visit:    Mild intermittent asthma without complication  -     Albuterol Sulfate (PROAIR RESPICLICK) 202 (90 Base) MCG/ACT AEPB; Inhale 1 puff every 4 (four) hours as needed (wheezing/ sob)  -     budesonide-formoterol (SYMBICORT) 160-4 5 mcg/act inhaler;  Inhale 2 puffs 2 (two) times a day Rinse mouth after use  -     CBC and differential; Future  -     Comprehensive metabolic panel; Future  -     Lipid panel; Future  -     TSH, 3rd generation with Free T4 reflex; Future    Abnormal TSH  -     CBC and differential; Future  -     Comprehensive metabolic panel; Future  -     Lipid panel; Future  -     TSH, 3rd generation with Free T4 reflex; Future    Overweight (BMI 25 0-29 9)  -     CBC and differential; Future  -     Comprehensive metabolic panel; Future  -     Lipid panel; Future  -     TSH, 3rd generation with Free T4 reflex; Future    Screening for malignant neoplasm of breast  -     Mammo screening bilateral w cad; Future    Healthcare maintenance  -     Ambulatory referral to Gynecology; Future    Other orders  -     Discontinue: albuterol (PROVENTIL HFA,VENTOLIN HFA) 90 mcg/act inhaler; Inhale 2 puffs every 6 (six) hours as needed for wheezing          Subjective:      Patient ID: Sharlene Díaz is a 47 y o  female  HPI     Pt presents by herself today for a routine follow up for medication refill  Last seen in our office 8/2018 for an acute visit     Pt with mild, intermittent asthma, uses Symbicort only on a prn basis, during acute exacerbations or acute illnesses  Has an Albuterol rescue inhaler for prn use  Requesting a refill on both of these  Denies any recent exacerbations    Denies SOB, wheezing or coughing    Overweight- no regular exercise 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    Flu vaccine- declines  Pneumovax- declines    Last had labs done years ago at [facility]   Notes her thyroid function was "out of normal", but she does NOT want any labs done  Reports she was never on a medication for her thyroid     Last PAP- several years ago, can't even recall who she saw for this  Has never had a mammogram  No previous CRC screenings     The following portions of the patient's history were reviewed and updated as appropriate: allergies, current medications, past medical history, past social history and problem list     Review of Systems   Constitutional: Negative for chills, fatigue and fever  Respiratory: Negative for cough, shortness of breath and wheezing  Cardiovascular: Negative for chest pain, palpitations and leg swelling  Gastrointestinal: Negative for abdominal pain, blood in stool, constipation, diarrhea and nausea  Genitourinary: Negative for dysuria  Musculoskeletal: Negative for arthralgias and myalgias  Skin: Negative for rash and wound  Neurological: Negative for dizziness, weakness, numbness and headaches  Psychiatric/Behavioral: Negative for sleep disturbance and suicidal ideas  Objective:      /72   Pulse 72   Temp 99 1 °F (37 3 °C) (Tympanic)   Resp 16   Ht 5' 6" (1 676 m)   Wt 78 9 kg (174 lb)   BMI 28 08 kg/m²          Physical Exam   Constitutional: She is oriented to person, place, and time  She appears well-developed and well-nourished  No distress  HENT:   Head: Normocephalic and atraumatic  Eyes: Pupils are equal, round, and reactive to light  Conjunctivae are normal    Neck: Normal range of motion  Neck supple  No thyromegaly present  Cardiovascular: Normal rate, regular rhythm and normal heart sounds  No murmur heard  Pulmonary/Chest: Effort normal and breath sounds normal  No stridor  No respiratory distress  She has no wheezes  She has no rales  She exhibits no tenderness  Musculoskeletal: Normal range of motion  Lymphadenopathy:     She has no cervical adenopathy  Neurological: She is alert and oriented to person, place, and time  Skin: Skin is warm and dry  She is not diaphoretic  Psychiatric: She has a normal mood and affect  BMI Counseling: Body mass index is 28 08 kg/m²  The BMI is above normal  Nutrition recommendations include reducing portion sizes, decreasing overall calorie intake, 3-5 servings of fruits/vegetables daily and reducing fast food intake  Exercise recommendations include moderate aerobic physical activity for 150 minutes/week

## 2020-01-27 NOTE — ASSESSMENT & PLAN NOTE
I did order lab work for the patient but she notes she will likely not have these performed    Advised to reconsider and explained the importance of monitoring lab work

## 2020-02-21 DIAGNOSIS — J45.20 MILD INTERMITTENT ASTHMA WITHOUT COMPLICATION: ICD-10-CM

## 2020-02-21 RX ORDER — BUDESONIDE AND FORMOTEROL FUMARATE DIHYDRATE 160; 4.5 UG/1; UG/1
AEROSOL RESPIRATORY (INHALATION)
Qty: 10.2 INHALER | Refills: 3 | Status: SHIPPED | OUTPATIENT
Start: 2020-02-21 | End: 2020-08-27

## 2020-08-27 DIAGNOSIS — J45.20 MILD INTERMITTENT ASTHMA WITHOUT COMPLICATION: ICD-10-CM

## 2020-08-27 RX ORDER — BUDESONIDE AND FORMOTEROL FUMARATE DIHYDRATE 160; 4.5 UG/1; UG/1
AEROSOL RESPIRATORY (INHALATION)
Qty: 10.2 INHALER | Refills: 3 | Status: SHIPPED | OUTPATIENT
Start: 2020-08-27 | End: 2020-12-22

## 2020-08-27 NOTE — TELEPHONE ENCOUNTER
Indio Hinton saw this patient for asthma in 1 /2020  Please refill Rx forSymbicort if appropriate  Thank you

## 2020-12-22 DIAGNOSIS — J45.20 MILD INTERMITTENT ASTHMA WITHOUT COMPLICATION: ICD-10-CM

## 2020-12-22 RX ORDER — BUDESONIDE AND FORMOTEROL FUMARATE DIHYDRATE 160; 4.5 UG/1; UG/1
AEROSOL RESPIRATORY (INHALATION)
Qty: 30.6 G | Refills: 5 | Status: SHIPPED | OUTPATIENT
Start: 2020-12-22

## 2020-12-29 ENCOUNTER — VBI (OUTPATIENT)
Dept: ADMINISTRATIVE | Facility: OTHER | Age: 50
End: 2020-12-29

## 2021-03-15 ENCOUNTER — VBI (OUTPATIENT)
Dept: ADMINISTRATIVE | Facility: OTHER | Age: 51
End: 2021-03-15

## 2021-05-10 ENCOUNTER — VBI (OUTPATIENT)
Dept: ADMINISTRATIVE | Facility: OTHER | Age: 51
End: 2021-05-10

## 2021-07-14 ENCOUNTER — VBI (OUTPATIENT)
Dept: ADMINISTRATIVE | Facility: OTHER | Age: 51
End: 2021-07-14

## 2021-08-09 ENCOUNTER — VBI (OUTPATIENT)
Dept: ADMINISTRATIVE | Facility: OTHER | Age: 51
End: 2021-08-09

## 2021-08-31 ENCOUNTER — VBI (OUTPATIENT)
Dept: ADMINISTRATIVE | Facility: OTHER | Age: 51
End: 2021-08-31

## 2021-09-29 ENCOUNTER — TELEPHONE (OUTPATIENT)
Dept: FAMILY MEDICINE CLINIC | Facility: CLINIC | Age: 51
End: 2021-09-29

## 2021-10-05 ENCOUNTER — VBI (OUTPATIENT)
Dept: ADMINISTRATIVE | Facility: OTHER | Age: 51
End: 2021-10-05

## 2021-11-01 ENCOUNTER — VBI (OUTPATIENT)
Dept: ADMINISTRATIVE | Facility: OTHER | Age: 51
End: 2021-11-01

## 2021-12-13 ENCOUNTER — VBI (OUTPATIENT)
Dept: ADMINISTRATIVE | Facility: OTHER | Age: 51
End: 2021-12-13

## 2022-03-28 ENCOUNTER — VBI (OUTPATIENT)
Dept: ADMINISTRATIVE | Facility: OTHER | Age: 52
End: 2022-03-28

## 2023-03-28 NOTE — TELEPHONE ENCOUNTER
05/10/21 11:00 AM     See documentation in the [employer] SmartForm       Isidoro Márquez Duplicate request for Fosamax. Prescription sent on 3/23/23

## 2023-06-01 DIAGNOSIS — J45.20 MILD INTERMITTENT ASTHMA WITHOUT COMPLICATION: ICD-10-CM

## 2023-06-01 RX ORDER — BUDESONIDE AND FORMOTEROL FUMARATE DIHYDRATE 160; 4.5 UG/1; UG/1
AEROSOL RESPIRATORY (INHALATION)
Qty: 30.6 G | Refills: 5 | OUTPATIENT
Start: 2023-06-01

## 2023-06-06 ENCOUNTER — TELEPHONE (OUTPATIENT)
Dept: ADMINISTRATIVE | Facility: OTHER | Age: 53
End: 2023-06-06

## 2023-06-06 NOTE — TELEPHONE ENCOUNTER
Upon review of the In Basket request we were able to locate, review, and update the patient chart as requested for CRC: Colonoscopy  Any additional questions or concerns should be emailed to the Practice Liaisons via the appropriate education email address, please do not reply via In Basket      Thank you  Sravani Reasons

## 2023-06-06 NOTE — TELEPHONE ENCOUNTER
----- Message from Hank Owen LPN sent at 6/4/6585  2:30 PM EDT -----  Regarding: care gap request  06/05/23 2:30 PM    Hello, our patient attached above has had CRC: Colonoscopy completed/performed  Please assist in updating the patient chart by pulling a previous Electronic Medical Record (EMR) document  The previous EMR is [address] everywhere  The date of service is 5/15/23 with Dr Jean Lares for colon section removal surgery       Thank you,  Hank HOBSON

## 2023-06-08 ENCOUNTER — OFFICE VISIT (OUTPATIENT)
Dept: FAMILY MEDICINE CLINIC | Facility: CLINIC | Age: 53
End: 2023-06-08

## 2023-06-08 VITALS
HEIGHT: 66 IN | WEIGHT: 187.4 LBS | TEMPERATURE: 97.9 F | HEART RATE: 100 BPM | SYSTOLIC BLOOD PRESSURE: 124 MMHG | DIASTOLIC BLOOD PRESSURE: 68 MMHG | OXYGEN SATURATION: 96 % | RESPIRATION RATE: 19 BRPM | BODY MASS INDEX: 30.12 KG/M2

## 2023-06-08 DIAGNOSIS — Z13.220 SCREENING FOR HYPERLIPIDEMIA: ICD-10-CM

## 2023-06-08 DIAGNOSIS — Z12.31 ENCOUNTER FOR SCREENING MAMMOGRAM FOR MALIGNANT NEOPLASM OF BREAST: ICD-10-CM

## 2023-06-08 DIAGNOSIS — Z12.31 ENCOUNTER FOR SCREENING MAMMOGRAM FOR BREAST CANCER: ICD-10-CM

## 2023-06-08 DIAGNOSIS — J45.20 MILD INTERMITTENT ASTHMA WITHOUT COMPLICATION: ICD-10-CM

## 2023-06-08 DIAGNOSIS — Z12.4 SCREENING FOR CERVICAL CANCER: ICD-10-CM

## 2023-06-08 DIAGNOSIS — R79.89 ELEVATED TSH: ICD-10-CM

## 2023-06-08 DIAGNOSIS — L30.9 ECZEMA, UNSPECIFIED TYPE: ICD-10-CM

## 2023-06-08 DIAGNOSIS — Z01.818 PREOPERATIVE CLEARANCE: Primary | ICD-10-CM

## 2023-06-08 DIAGNOSIS — C18.7 ADENOCARCINOMA OF SIGMOID COLON (HCC): ICD-10-CM

## 2023-06-08 RX ORDER — NEOMYCIN SULFATE 500 MG/1
TABLET ORAL
COMMUNITY
Start: 2023-06-02

## 2023-06-08 RX ORDER — SODIUM PICOSULFATE, MAGNESIUM OXIDE, AND ANHYDROUS CITRIC ACID 12; 3.5; 1 G/175ML; G/175ML; MG/175ML
LIQUID ORAL
COMMUNITY
Start: 2023-04-28

## 2023-06-08 RX ORDER — BUDESONIDE AND FORMOTEROL FUMARATE DIHYDRATE 160; 4.5 UG/1; UG/1
2 AEROSOL RESPIRATORY (INHALATION) 2 TIMES DAILY
Qty: 30.6 G | Refills: 5 | Status: SHIPPED | OUTPATIENT
Start: 2023-06-08

## 2023-06-08 RX ORDER — ALBUTEROL SULFATE 90 UG/1
2 AEROSOL, METERED RESPIRATORY (INHALATION) EVERY 6 HOURS PRN
Qty: 18 G | Refills: 5 | Status: SHIPPED | OUTPATIENT
Start: 2023-06-08

## 2023-06-08 RX ORDER — ERYTHROMYCIN 500 MG/1
TABLET, DELAYED RELEASE ORAL
COMMUNITY
Start: 2023-06-05

## 2023-06-08 RX ORDER — TRIAMCINOLONE ACETONIDE 1 MG/G
CREAM TOPICAL 2 TIMES DAILY
Qty: 15 G | Refills: 1 | Status: SHIPPED | OUTPATIENT
Start: 2023-06-08

## 2023-06-08 NOTE — PROGRESS NOTES
"Subjective:     Pablo Bullard is a 47 y o  female who presents to the office today to re-establish care with our office (last seen here 1/27/2020)  for a preoperative consultation at the request of surgeon Dr Geremias Pickett who plans on performing laparoscopic partial colon resection on June 14  Preop lab work revealed a TSH of 14 78, total T4 of 8 00  Pt notes she thought that she was told she had an over-active thyroid in the past   She requests repeat lab work before initiating any medication treatment and she requests to do this after her surgery     Pt with mild, intermittent asthma- uses an Albuterol inhaler and Symbicort only during flare ups- hasn't used recently and inhalers and these haven't been refilled in years  Denies SOB, wheezing, coughing     She is due for routine lab work, Mammogram, PAP    The following portions of the patient's history were reviewed and updated as appropriate: allergies, current medications, past family history, past medical history, past social history, past surgical history and problem list     Review of Systems  Constitutional: negative  Eyes: negative  Ears, nose, mouth, throat, and face: negative  Respiratory: negative  Cardiovascular: negative  Gastrointestinal: positive for abdominal pain  Genitourinary:negative  Integument/breast: negative  Hematologic/lymphatic: negative  Musculoskeletal:negative  Neurological: negative  Behavioral/Psych: negative  Endocrine: negative  Allergic/Immunologic: negative     Objective:     /68   Pulse 100   Temp 97 9 °F (36 6 °C) (Temporal)   Resp 19   Ht 5' 6\" (1 676 m)   Wt 85 kg (187 lb 6 4 oz)   SpO2 96%   BMI 30 25 kg/m²   General appearance: alert and oriented, in no acute distress  Head: Normocephalic, without obvious abnormality, atraumatic  Eyes: conjunctivae/corneas clear  PERRL, EOM's intact  Fundi benign    Neck: no adenopathy, no carotid bruit, no JVD, supple, symmetrical, trachea midline and thyroid not " enlarged, symmetric, no tenderness/mass/nodules  Back: symmetric, no curvature  ROM normal  No CVA tenderness  Lungs: clear to auscultation bilaterally  Heart: regular rate and rhythm, S1, S2 normal, no murmur, click, rub or gallop  Abdomen: soft, non-tender; bowel sounds normal; no masses,  no organomegaly  Extremities: extremities normal, warm and well-perfused; no cyanosis, clubbing, or edema  Pulses: 2+ and symmetric  Skin: Skin color, texture, turgor normal  No rashes or lesions  Lymph nodes: Cervical, supraclavicular, and axillary nodes normal   Neurologic: Grossly normal    Predictors of intubation difficulty:   Morbid obesity? no   Anatomically abnormal facies? no   Prominent incisors? no   Receding mandible? no   Short, thick neck? no   Neck range of motion: normal    Lab Review   No visits with results within 2 Month(s) from this visit  Latest known visit with results is:   Appointment on 01/24/2018   Component Date Value   • INFLU A PCR 01/24/2018 None Detected    • INFLU B PCR 01/24/2018 None Detected    • RSV PCR 01/24/2018 None Detected         Assessment:     47 y o  female with planned surgery as above  Known risk factors for perioperative complications: None    Difficulty with intubation is not anticipated  She is medically cleared for the planned procedure  Current medications which may produce withdrawal symptoms if withheld perioperatively: none      Plan:     1  Preoperative workup as follows lab work as noted above  2  Change in medication regimen before surgery: hold any NSAIDS, vitamins/ supplements 7 days prior to surgery  May take OTC Tylenol prn, max of 3g/24 hours  3  Elevated TSH- noted on preop lab work - TSH of 14 78, total T4 of 8 00  Pt requests repeat lab work before initiating any medication treatment and she requests to do this after her surgery  Orders placed  4  Mild intermittent asthma- stable, no recent exacerbations    Inhalers refilled today     Routine lab work ordered which can be completed following surgery    Mammogram and GYN referrals also placed today

## 2023-11-22 ENCOUNTER — VBI (OUTPATIENT)
Dept: ADMINISTRATIVE | Facility: OTHER | Age: 53
End: 2023-11-22

## 2024-09-03 DIAGNOSIS — Z12.31 SCREENING MAMMOGRAM FOR BREAST CANCER: Primary | ICD-10-CM
